# Patient Record
Sex: MALE | Race: WHITE | URBAN - METROPOLITAN AREA
[De-identification: names, ages, dates, MRNs, and addresses within clinical notes are randomized per-mention and may not be internally consistent; named-entity substitution may affect disease eponyms.]

---

## 2017-08-01 ENCOUNTER — HOSPITAL ENCOUNTER (EMERGENCY)
Facility: CLINIC | Age: 67
End: 2017-08-01
Payer: MEDICARE

## 2017-08-01 ENCOUNTER — APPOINTMENT (OUTPATIENT)
Dept: ULTRASOUND IMAGING | Facility: CLINIC | Age: 67
End: 2017-08-01
Attending: EMERGENCY MEDICINE
Payer: MEDICARE

## 2017-08-01 ENCOUNTER — HOSPITAL ENCOUNTER (EMERGENCY)
Facility: CLINIC | Age: 67
Discharge: HOME | End: 2017-08-01
Attending: EMERGENCY MEDICINE | Admitting: EMERGENCY MEDICINE
Payer: MEDICARE

## 2017-08-01 VITALS
SYSTOLIC BLOOD PRESSURE: 163 MMHG | WEIGHT: 208 LBS | DIASTOLIC BLOOD PRESSURE: 102 MMHG | OXYGEN SATURATION: 98 % | BODY MASS INDEX: 29.78 KG/M2 | HEIGHT: 70 IN | HEART RATE: 91 BPM | TEMPERATURE: 98 F | RESPIRATION RATE: 16 BRPM

## 2017-08-01 DIAGNOSIS — S70.12XA THIGH HEMATOMA, LEFT, INITIAL ENCOUNTER: ICD-10-CM

## 2017-08-01 PROCEDURE — 93971 EXTREMITY STUDY: CPT | Mod: LT

## 2017-08-01 PROCEDURE — 99284 EMERGENCY DEPT VISIT MOD MDM: CPT | Mod: 25

## 2017-08-01 RX ORDER — MULTIPLE VITAMINS W/ MINERALS TAB 9MG-400MCG
1 TAB ORAL DAILY
COMMUNITY

## 2017-08-01 ASSESSMENT — ENCOUNTER SYMPTOMS
SHORTNESS OF BREATH: 0
FEVER: 0

## 2017-08-01 NOTE — DISCHARGE INSTRUCTIONS
If you develop redness or increased warmth of your leg or if you get a fever, you need to seek immediate medical care.

## 2017-08-01 NOTE — ED AVS SNAPSHOT
Emergency Department    [address] [place] [address]    Phone:  [phone]    Fax:  [phone]                                       Duane Nolan   MRN: [id number]    Department:   Emergency Department   Date of Visit:  8/1/2017           After Visit Summary Signature Page     I have received my discharge instructions, and my questions have been answered. I have discussed any challenges I see with this plan with the nurse or doctor.    ..........................................................................................................................................  Patient/Patient Representative Signature      ..........................................................................................................................................  Patient Representative Print Name and Relationship to Patient    ..................................................               ................................................  Date                                            Time    ..........................................................................................................................................  Reviewed by Signature/Title    ...................................................              ..............................................  Date                                                            Time

## 2017-08-01 NOTE — ED PROVIDER NOTES
History     Chief Complaint:  Leg Pain    HPI   Duane Nolan is a 66 year old male with a history of diabetes, hyperlipidemia, and hypertension who presents to the emergency department today for evaluation of left upper leg pain. The patient reports that he was woken up this morning at 0500 with pain in his left posterior thigh that radiated up to his groin. He states that he was able to go back to sleep but then his pain worsened throughout the day and made it difficult for him to walk, causing him to limp. He reports some leg swelling in his upper left thigh. He denies fever, shortness of breath or chest pain. He notes that he is concerned for a clot since his sister has been diagnosed with a clotting disorder and his brother had multiple clots a few months ago. The patient states that he has never been tested for a clotting problem.     Cardiac/PE/DVT Risk Factors:  History of hypertension - Positive  History of hyperlipidemia - Positive  History of diabetes - Positive  History of smoking - Negative  Family history of PE/DVT - Positive -- Brother  Recent travel - Negative    Allergies:  No Known Drug Allergies     Medications:    Metformin HCl  Atorvastatin Calcium  Lisinopril  Aspirin    Past Medical History:    Diabetes  Hyperlipidemia  Hypertension    Past Surgical History:    Orthopedic surgery -- rotator cuff    Family History:    History reviewed. No pertinent family history.    Social History:  Smoking Status: Never Smoker  Smokeless Tobacco: Never Used  Alcohol Use: Negative  Marital Status:   [2]     Review of Systems   Constitutional: Negative for fever.   Respiratory: Negative for shortness of breath.    Cardiovascular: Positive for leg swelling (left upper). Negative for chest pain.   Musculoskeletal:        Positive for left upper leg pain.   10 point review of systems performed and is negative except as above and in HPI.    Physical Exam     Vitals:  Patient Vitals for the past 24  "hrs:   BP Temp Temp src Pulse Resp SpO2 Height Weight   08/01/17 1904 (!) 163/102 - - 91 16 - - -   08/01/17 1728 110/57 98  F (36.7  C) Oral 104 16 98 % 1.778 m (5' 10\") 94.3 kg (208 lb)       Physical Exam  General: Resting on the gurney, appears comfortable  Head:  The scalp, face, and head appear normal  Mouth/Throat: Mucus membranes are moist  CV:  Regular rate    Normal S1 and S2  No pathological murmur   Resp:  Breath sounds clear and equal bilaterally    Non-labored, no retractions or accessory muscle use    No coarseness    No wheezing   GI:  Abdomen is soft, no rigidity    No tenderness to palpation  MS:  Left medial thigh with swelling and tenderness to palpation. No redness, no fluctuance, no increased warmth.    Normal motor assessment of all extremities.    Good capillary refill noted.  Neuro:   Speech is normal and fluent. No apparent deficit.  Psych: Awake. Alert.  Normal affect.      Appropriate interactions.    Emergency Department Course     Imaging:  Radiology findings were communicated with the patient who voiced understanding of the findings.    US Lower Extremity Venous Duplex Left  1. No evidence for deep venous thrombosis in the left lower extremity.  2. 6.2 x 3.5 x 3.1 cm medial distal thigh complex area in location of  pain which could represent a small hematoma.  Reading per radiology    Emergency Department Course:  Nursing notes and vitals reviewed.  I performed an exam of the patient as documented above.   The patient was sent for a US Lower Extremity Venous Duplex Left while in the emergency department, results above.   1846 Patient was rechecked.  I discussed the treatment plan with the patient. They expressed understanding of this plan and consented to discharge. They will be discharged home with instructions for care and follow up. In addition, the patient will return to the emergency department if their symptoms persist, worsen, if new symptoms arise or if there is any concern.  " All questions were answered.  I personally reviewed the imaging results with the patient and answered all related questions prior to discharge.    Impression & Plan      Medical Decision Making:  Duane Nolan is a 66 year old male who presents to the emergency department complaining of left leg pain. The patient has a family history of blood clots in two of his siblings, one of which was diagnosed with a clotting disorder and the other one has not yet been evaluated. As he noted pain and swelling in his medial thigh, he became very concerned for blood clot, prompting him to come to the emergency department for ultrasound evaluation for DVT. Ultrasound was obtained and no DVT was found, however he was found to have a fluid collection most consistent with hematoma on the medial aspect of the thigh. I did consider abscess or other infectious etiology in my differential, however, there was a fluid collection without any warmth, redness, fluctuance, or other concerning factors for infectious etiology. As such I am confident that this is most likely hematoma and gave instructions and return precautions and will discharge him in good condition.      Diagnosis:    ICD-10-CM    1. Thigh hematoma, left, initial encounter S70.12XA      Disposition:   The patient is discharged to home.    Scribe Disclosure:  I, Christopher Camilo, am serving as a scribe at 6:13 PM on 8/1/2017 to document services personally performed by Cass Nieves MD based on my observations and the provider's statements to me.   EMERGENCY DEPARTMENT       Cass Nieves MD  08/06/17 0017

## 2017-08-01 NOTE — ED AVS SNAPSHOT
Emergency Department    [address] [facility] [address]    Phone:  [phone]    Fax:  [phone]                                       Duane Nolan   MRN: [id number]    Department:   Emergency Department   Date of Visit:  8/1/2017           Patient Information     Date Of Birth          1950        Your diagnoses for this visit were:     Thigh hematoma, left, initial encounter        You were seen by Cass Nieves MD.      Follow-up Information     Follow up with Vaughn Araujo MD. Schedule an appointment as soon as possible for a visit in 2 days.    Specialty:  Family Practice    Contact information:    [place]  [address] [facility] [id number] [phone]          Follow up with  Emergency Department.    Specialty:  EMERGENCY MEDICINE    Why:  As needed, If symptoms worsen    Contact information:    [address] [facility] [id number] [phone]        Discharge Instructions       If you develop redness or increased warmth of your leg or if you get a fever, you need to seek immediate medical care.      Discharge References/Attachments     HEMATOMA (ENGLISH)      24 Hour Appointment Hotline       To make an appointment at any [facility], call [phone] ([phone]). If you don't have a family doctor or clinic, we will help you find one. [facility] clinics are conveniently located to serve the needs of you and your family.             Review of your medicines      Our records show that you are taking the medicines listed below. If these are incorrect, please call your family doctor or clinic.        Dose / Directions Last dose taken    ASPIRIN EC PO   Dose:  81 mg        Take 81 mg by mouth daily   Refills:  0        ATORVASTATIN CALCIUM PO   Dose:  20 mg        Take 20 mg by mouth   Refills:  0        LISINOPRIL PO        Refills:  0        METFORMIN HCL PO        Refills:  0        Multi-vitamin Tabs tablet   Dose:  1 tablet         Take 1 tablet by mouth daily   Refills:  0        VITAMIN D (CHOLECALCIFEROL) PO   Dose:  2000 Units        Take 2,000 Units by mouth daily   Refills:  0                Procedures and tests performed during your visit     US Lower Extremity Venous Duplex Left      Orders Needing Specimen Collection     None      Pending Results     Date and Time Order Name Status Description    8/1/2017 1741 US Lower Extremity Venous Duplex Left Preliminary             Pending Culture Results     No orders found from 7/30/2017 to 8/2/2017.            Pending Results Instructions     If you had any lab results that were not finalized at the time of your Discharge, you can call the ED Lab Result RN at [phone]. You will be contacted by this team for any positive Lab results or changes in treatment. The nurses are available 7 days a week from 10A to 6:30P.  You can leave a message 24 hours per day and they will return your call.        Test Results From Your Hospital Stay        8/1/2017  6:27 PM      Narrative     VENOUS ULTRASOUND LEFT LOWER EXTREMITY  8/1/2017 6:23 PM     HISTORY: Pain, swelling, siblings with clotting disorder, never  tested.    COMPARISON: None.    FINDINGS:  Examination of the deep veins with graded compression and  color flow Doppler with spectral wave form analysis shows no evidence  of thrombus in the common femoral vein, femoral vein, popliteal vein  or calf veins.  There is no venous insufficiency.  There is an  intramuscular complex area in the area of pain located in the medial  distal thigh measuring 6.2 x 3.5 x 3.1 cm.        Impression     IMPRESSION:  1. No evidence for deep venous thrombosis in the left lower extremity.  2. 6.2 x 3.5 x 3.1 cm medial distal thigh complex area in location of  pain which could represent a small hematoma.                Clinical Quality Measure: Blood Pressure Screening     Your blood pressure was checked while you were in the emergency department today. The last  "reading we obtained was  BP: (!) 163/102 . Please read the guidelines below about what these numbers mean and what you should do about them.  If your systolic blood pressure (the top number) is less than 120 and your diastolic blood pressure (the bottom number) is less than 80, then your blood pressure is normal. There is nothing more that you need to do about it.  If your systolic blood pressure (the top number) is 120-139 or your diastolic blood pressure (the bottom number) is 80-89, your blood pressure may be higher than it should be. You should have your blood pressure rechecked within a year by a primary care provider.  If your systolic blood pressure (the top number) is 140 or greater or your diastolic blood pressure (the bottom number) is 90 or greater, you may have high blood pressure. High blood pressure is treatable, but if left untreated over time it can put you at risk for heart attack, stroke, or kidney failure. You should have your blood pressure rechecked by a primary care provider within the next 4 weeks.  If your provider in the emergency department today gave you specific instructions to follow-up with your doctor or provider even sooner than that, you should follow that instruction and not wait for up to 4 weeks for your follow-up visit.        Thank you for choosing [facility]       Thank you for choosing [facility] for your care. Our goal is always to provide you with excellent care. Hearing back from our patients is one way we can continue to improve our services. Please take a few minutes to complete the written survey that you may receive in the mail after you visit with us. Thank you!        [employer] Information     [employer] lets you send messages to your doctor, view your test results, renew your prescriptions, schedule appointments and more. To sign up, go to [email or web address] . Click on \"Log in\" on the left side of the screen, which will take you to the Welcome page. Then click on \"Sign " "up Now\" on the right side of the page.     You will be asked to enter the access code listed below, as well as some personal information. Please follow the directions to create your username and password.     Your access code is: [email or web address]  Expires: 10/30/2017  6:55 PM     Your access code will  in 90 days. If you need help or a new code, please call your [facility] clinic or [phone].        Care EveryWhere ID     This is your Care EveryWhere ID. This could be used by other organizations to access your [facility] medical records  [id number]        Equal Access to Services     [facility] : Michael Dean, joseph ellis, christian mendoza, laly mcgill . So [facility] [phone].    ATENCIÓN: Si habla español, tiene a heath disposición servicios gratuitos de asistencia lingüística. Llame al [phone].    We comply with applicable federal civil rights laws and [place] laws. We do not discriminate on the basis of race, color, national origin, age, disability sex, sexual orientation or gender identity.            After Visit Summary       This is your record. Keep this with you and show to your community pharmacist(s) and doctor(s) at your next visit.                  "